# Patient Record
Sex: MALE | Race: BLACK OR AFRICAN AMERICAN | URBAN - METROPOLITAN AREA
[De-identification: names, ages, dates, MRNs, and addresses within clinical notes are randomized per-mention and may not be internally consistent; named-entity substitution may affect disease eponyms.]

---
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
## 2023-02-15 ENCOUNTER — DASHBOARD ENCOUNTERS (OUTPATIENT)
Age: 18
End: 2023-02-15

## 2023-02-15 ENCOUNTER — LAB OUTSIDE AN ENCOUNTER (OUTPATIENT)
Dept: URBAN - METROPOLITAN AREA CLINIC 98 | Facility: CLINIC | Age: 18
End: 2023-02-15

## 2023-02-15 ENCOUNTER — WEB ENCOUNTER (OUTPATIENT)
Dept: URBAN - METROPOLITAN AREA CLINIC 98 | Facility: CLINIC | Age: 18
End: 2023-02-15

## 2023-02-15 ENCOUNTER — OFFICE VISIT (OUTPATIENT)
Dept: URBAN - METROPOLITAN AREA CLINIC 98 | Facility: CLINIC | Age: 18
End: 2023-02-15
Payer: MEDICAID

## 2023-02-15 VITALS
WEIGHT: 111.2 LBS | HEART RATE: 90 BPM | HEIGHT: 64 IN | DIASTOLIC BLOOD PRESSURE: 69 MMHG | SYSTOLIC BLOOD PRESSURE: 121 MMHG | TEMPERATURE: 98.7 F | BODY MASS INDEX: 18.98 KG/M2

## 2023-02-15 DIAGNOSIS — R10.9 ABDOMINAL DISCOMFORT: ICD-10-CM

## 2023-02-15 DIAGNOSIS — K59.00 CONSTIPATION, UNSPECIFIED CONSTIPATION TYPE: ICD-10-CM

## 2023-02-15 DIAGNOSIS — R10.13 EPIGASTRIC DISCOMFORT: ICD-10-CM

## 2023-02-15 PROBLEM — 14760008: Status: ACTIVE | Noted: 2023-02-15

## 2023-02-15 PROCEDURE — 99213 OFFICE O/P EST LOW 20 MIN: CPT

## 2023-02-15 RX ORDER — PANTOPRAZOLE SODIUM 40 MG/1
1 TABLET TABLET, DELAYED RELEASE ORAL ONCE A DAY
Qty: 90 TABLET | Refills: 4 | Status: ON HOLD | COMMUNITY
Start: 2022-09-02

## 2023-02-15 RX ORDER — FAMOTIDINE 40 MG/1
1 TABLET AS NEEDED TABLET, FILM COATED ORAL ONCE A DAY
Qty: 90 TABLET | Refills: 3 | Status: ACTIVE | COMMUNITY
Start: 2022-09-02

## 2023-02-15 NOTE — HPI-TODAY'S VISIT:
Previously in 10/2022, Patient is an 16-year-old male with a past medical history of cerebral palsy.   Last seen by Dr. Juarez on 9/2/2022 with abdominal discomfort.   Had previously tried Pepto with no improvement.  Associated decreased food intake.   Has also used famotidine with no help.   He was placed on famotidine 40 mg 1 tablet/day as needed as well as pantoprazole 40 mg 1 tablet daily.   Had CT scan and ultrasound in ER.   Currently taking famotidine 40mg at night before bed  Has been well and has not needed to take pantoprazole Took pantoprazole for one week then discontinued due to improvement  Has gained 3lbs since last office visit  Denies N/V Currently drinking boost- 2/day plus meals Stooling daily- mom described stools as soft Not currently using Miralax  Denies BRBPR or melena . Today on 2/15/2023, mom presents with patient to f/u on symptoms  Member of household diagnosed with h pylori- person who assists him  and Mom is requesting patient be tested for h pylori  Denies vomiting episodes  Taking famotidine as needed- has not needed it recently per mom  Mom states patient can have constipation Has historically used Mirlax but mom does not want to rely on it Has a BM daily- mom described as "fidelina" Denies BRBPR or melena

## 2023-02-17 ENCOUNTER — TELEPHONE ENCOUNTER (OUTPATIENT)
Dept: URBAN - METROPOLITAN AREA CLINIC 96 | Facility: CLINIC | Age: 18
End: 2023-02-17

## 2023-02-17 LAB
H PYLORI BREATH TEST: POSITIVE
H. PYLORI BREATH COLLECTION: (no result)

## 2023-02-17 RX ORDER — PANTOPRAZOLE SODIUM 40 MG/1
1 TABLET TABLET, DELAYED RELEASE ORAL ONCE A DAY
Qty: 90 TABLET | Refills: 4 | Status: ON HOLD | COMMUNITY
Start: 2022-09-02

## 2023-02-17 RX ORDER — AMOXICILLIN 500 MG/1
2 CAPSULES CAPSULE ORAL TWICE A DAY
Qty: 56 CAPSULE | Refills: 0 | OUTPATIENT
Start: 2023-02-17 | End: 2023-03-03

## 2023-02-17 RX ORDER — PANTOPRAZOLE SODIUM 20 MG/1
1 TABLET TABLET, DELAYED RELEASE ORAL TWICE A DAY
Qty: 28 TABLET | Refills: 0 | OUTPATIENT
Start: 2023-02-17

## 2023-02-17 RX ORDER — FAMOTIDINE 40 MG/1
1 TABLET AS NEEDED TABLET, FILM COATED ORAL ONCE A DAY
Qty: 90 TABLET | Refills: 3 | Status: ACTIVE | COMMUNITY
Start: 2022-09-02

## 2023-02-17 RX ORDER — CLARITHROMYCIN 500 MG/1
1 TABLET TABLET, FILM COATED ORAL
Qty: 28 TABLET | Refills: 0 | OUTPATIENT
Start: 2023-02-17 | End: 2023-03-03

## 2023-11-10 NOTE — HPI-TODAY'S VISIT:
Has cerebral palsy, epilepsy Having issues with abdominal discomfort intermittent over the last year.  More issues for last 1-2 months.  Tried pepto as well Reduced food intake.  Used famotidine as well with not much help.  Using miralax as well.  Rare nausea. No vomiting Lost about 15 lbs over the last year.
Bleeding that does not stop/Pain not relieved by Medications/Fever greater than (need to indicate Fahrenheit or Celsius)/Numbness, tingling, color or temperature change to extremity/Nausea and vomiting that does not stop/Excessive diarrhea/Inability to tolerate liquids or foods